# Patient Record
Sex: FEMALE | Race: WHITE
[De-identification: names, ages, dates, MRNs, and addresses within clinical notes are randomized per-mention and may not be internally consistent; named-entity substitution may affect disease eponyms.]

---

## 2020-09-25 ENCOUNTER — HOSPITAL ENCOUNTER (OUTPATIENT)
Dept: HOSPITAL 92 - BICULT | Age: 25
Discharge: HOME | End: 2020-09-25
Attending: PHYSICIAN ASSISTANT
Payer: COMMERCIAL

## 2020-09-25 DIAGNOSIS — N63.10: Primary | ICD-10-CM

## 2020-09-25 DIAGNOSIS — N63.20: ICD-10-CM

## 2020-09-25 NOTE — ULT
LIMITED RIGHT BREAST ULTRASOUND

LIMITED LEFT BREAST ULTRASOUND:

9/25/20

 

HISTORY: 

Possible lumps in both breasts.

 

FINDINGS: 

There is a lobulated well-circumscribed  nonshadowing solid mass at the 10 o'clock position of the ri
ght breast, 4 cm from the nipple which is stable since exam of 7/12/18 from an outside institution. 

 

A new palpable mass at the 9 o'clock position of the right breast 3 cm from the nipple demonstrates a
 well-circumscribed nonshadowing solid mass measuring 2.4 x 1.5 x 2.3 cm. 

 

Sonographic evaluation of the palpable abnormality at the 1 o'clock position of the left breast, 8 cm
 from the nipple demonstrates a well circumscribed nonshadowing lobulated solid mass measuring 1.9 x 
1.2 x 2 cm. 

 

IMPRESSION: 

Findings are most likely due to bilateral fibroadenomas. 

 

 

POS: OFF

## 2020-11-11 ENCOUNTER — HOSPITAL ENCOUNTER (OUTPATIENT)
Dept: HOSPITAL 92 - LABBT | Age: 25
Discharge: HOME | End: 2020-11-11
Attending: SPECIALIST
Payer: COMMERCIAL

## 2020-11-11 DIAGNOSIS — Z01.818: Primary | ICD-10-CM

## 2020-11-11 DIAGNOSIS — Z01.812: ICD-10-CM

## 2020-11-11 DIAGNOSIS — N63.20: ICD-10-CM

## 2020-11-11 DIAGNOSIS — Z20.828: ICD-10-CM

## 2020-11-11 LAB
ANION GAP SERPL CALC-SCNC: 12 MMOL/L (ref 10–20)
BASOPHILS # BLD AUTO: 0 10X3/UL (ref 0–0.2)
BASOPHILS NFR BLD AUTO: 0.6 % (ref 0–2)
BUN SERPL-MCNC: 12 MG/DL (ref 7–18.7)
CALCIUM SERPL-MCNC: 8.7 MG/DL (ref 7.8–10.44)
CHLORIDE SERPL-SCNC: 106 MMOL/L (ref 98–107)
CO2 SERPL-SCNC: 25 MMOL/L (ref 22–29)
CREAT CL PREDICTED SERPL C-G-VRATE: 0 ML/MIN (ref 70–130)
EOSINOPHIL # BLD AUTO: 0.1 10X3/UL (ref 0–0.5)
EOSINOPHIL NFR BLD AUTO: 1.9 % (ref 0–6)
GLUCOSE SERPL-MCNC: 86 MG/DL (ref 70–105)
HGB BLD-MCNC: 12.5 G/DL (ref 12–16)
LYMPHOCYTES NFR BLD AUTO: 32.2 % (ref 18–47)
MCH RBC QN AUTO: 29.5 PG (ref 27–33)
MCV RBC AUTO: 88 FL (ref 80–100)
MONOCYTES # BLD AUTO: 0.5 10X3/UL (ref 0–1.1)
MONOCYTES NFR BLD AUTO: 6.9 % (ref 0–10)
NEUTROPHILS # BLD AUTO: 3.9 10X3/UL (ref 1.5–8.4)
NEUTROPHILS NFR BLD AUTO: 58.1 % (ref 40–75)
PLATELET # BLD AUTO: 247 10X3/UL (ref 130–400)
POTASSIUM SERPL-SCNC: 3.9 MMOL/L (ref 3.5–5.1)
PREGS CONTROL BACKGROUND?: (no result)
PREGS CONTROL BAR APPEAR?: YES
RBC # BLD AUTO: 4.24 10X6/UL (ref 3.9–5.2)
SODIUM SERPL-SCNC: 139 MMOL/L (ref 136–145)
WBC # BLD AUTO: 6.7 10X3/UL (ref 4.5–11)

## 2020-11-11 PROCEDURE — 85025 COMPLETE CBC W/AUTO DIFF WBC: CPT

## 2020-11-11 PROCEDURE — 87635 SARS-COV-2 COVID-19 AMP PRB: CPT

## 2020-11-11 PROCEDURE — 80048 BASIC METABOLIC PNL TOTAL CA: CPT

## 2020-11-11 PROCEDURE — 84703 CHORIONIC GONADOTROPIN ASSAY: CPT

## 2020-11-11 PROCEDURE — U0003 INFECTIOUS AGENT DETECTION BY NUCLEIC ACID (DNA OR RNA); SEVERE ACUTE RESPIRATORY SYNDROME CORONAVIRUS 2 (SARS-COV-2) (CORONAVIRUS DISEASE [COVID-19]), AMPLIFIED PROBE TECHNIQUE, MAKING USE OF HIGH THROUGHPUT TECHNOLOGIES AS DESCRIBED BY CMS-2020-01-R: HCPCS

## 2020-11-16 ENCOUNTER — HOSPITAL ENCOUNTER (OUTPATIENT)
Dept: HOSPITAL 92 - SDC | Age: 25
Discharge: HOME | End: 2020-11-16
Attending: SPECIALIST
Payer: COMMERCIAL

## 2020-11-16 VITALS — BODY MASS INDEX: 25.9 KG/M2

## 2020-11-16 DIAGNOSIS — D24.2: Primary | ICD-10-CM

## 2020-11-16 DIAGNOSIS — Z79.899: ICD-10-CM

## 2020-11-16 DIAGNOSIS — D24.1: ICD-10-CM

## 2020-11-16 PROCEDURE — S0020 INJECTION, BUPIVICAINE HYDRO: HCPCS

## 2020-11-16 PROCEDURE — 0HBV0ZZ EXCISION OF BILATERAL BREAST, OPEN APPROACH: ICD-10-PCS | Performed by: SPECIALIST

## 2020-11-16 PROCEDURE — 88307 TISSUE EXAM BY PATHOLOGIST: CPT

## 2020-11-16 PROCEDURE — 88305 TISSUE EXAM BY PATHOLOGIST: CPT

## 2020-11-16 PROCEDURE — S0028 INJECTION, FAMOTIDINE, 20 MG: HCPCS

## 2020-11-17 NOTE — OP
DATE OF PROCEDURE:  11/16/2020



PREOPERATIVE DIAGNOSIS:  Bilateral breast fibroadenomas.



POSTOPERATIVE DIAGNOSIS:  Bilateral breast fibroadenomas.



OPERATION PERFORMED:  Excision of 4 separate fibroadenomas, 3 within the right

breast and 1 within the left breast. 



ANESTHESIA:  General with laryngeal mask airway.



INDICATIONS:  The patient is a 25-year-old white female.  She presented with

palpable masses in her bilateral breasts.  She additionally had noted a new lesion

at the lower inner quadrant of the right breast, that was causing her some

discomfort.  She requested that I evaluate this and remove it if I felt that this

was an additional fibroadenoma. 



DESCRIPTION OF OPERATION:  Informed consent was obtained.  The patient was taken to

the operating room where general anesthesia was obtained with the patient in supine

position.  Bilateral breasts were prepped with ChloraPrep and draped in sterile

fashion.  Attention was turned to the right breast first.  She had a large palpable

fibroadenoma at about the 9 o'clock Radian.  I decided to approach this through a

circumareolar incision.  Local anesthetic was infiltrated and a lateral based

circumareolar incision was created.  Dissection was carried through skin and

subcutaneous tissue.  I carefully dissected laterally until I arrived at the easily

palpable fibroadenoma.  This was carefully dissected circumferentially and removed

intact.  Meticulous hemostasis was obtained.  The wound was closed in layers with

3-0 and 4-0 Monocryl.  Attention was turned to the lower inner lesion of the right

breast.  Ultrasound was utilized.  I did identify a lesion that appeared consistent

with a smaller fibroadenoma.  This was palpable.  I therefore again placed local

anesthetic, created a small incision directly over the mass, grasped with Allis

clamps and dissected circumferentially.  The wound was further infiltrated with

local anesthetic and closed in layers with 3-0 and 4-0 Monocryl. 



Attention was turned to the lesion in the upper outer quadrant.  I would hope that I

would be able to address this through an axillary incision, however, it is too far

medial within the breast.  I therefore created an incision somewhat lateral to the

mass, which was palpable and visualized with ultrasound.  Dissection was carried

through skin and subcutaneous tissue.  I then dissected medially to identify the

lesion.  This appeared to be a multilobular fibroadenoma.  I removed the 1st portion

in one segment and the 2nd portion in the 2nd segment.  Again, hemostasis was

meticulous with electrocautery.  Additional local anesthetic was infiltrated.  The

wound was closed in layers with 3-0 and 4-0 Monocryl. 



Finally, I turned my attention to the left breast.  In the upper outer quadrant of

the breast, there was an easily palpable fibroadenoma.  I created an incision in the

inferior axilla, dissected inferiorly, and removed the fibroadenoma intact.

Additional local anesthetic was infiltrated.  Hemostasis was obtained.  The wound

was closed in layers with 3-0 and 4-0 Monocryl.  Dermabond was placed externally on

each of the 4 incisions.  There were no complications.  Blood loss was negligible,

less than 10 mL.  She was taken to recovery room in stable condition. 







Job ID:  [id number]